# Patient Record
Sex: MALE | Race: WHITE | NOT HISPANIC OR LATINO | URBAN - METROPOLITAN AREA
[De-identification: names, ages, dates, MRNs, and addresses within clinical notes are randomized per-mention and may not be internally consistent; named-entity substitution may affect disease eponyms.]

---

## 2024-03-28 ENCOUNTER — EMERGENCY (EMERGENCY)
Facility: HOSPITAL | Age: 79
LOS: 1 days | Discharge: HOME | End: 2024-03-28
Attending: EMERGENCY MEDICINE | Admitting: EMERGENCY MEDICINE
Payer: MEDICARE

## 2024-03-28 VITALS
HEART RATE: 131 BPM | TEMPERATURE: 98 F | RESPIRATION RATE: 19 BRPM | OXYGEN SATURATION: 96 % | DIASTOLIC BLOOD PRESSURE: 84 MMHG | WEIGHT: 225.09 LBS | SYSTOLIC BLOOD PRESSURE: 114 MMHG

## 2024-03-28 PROCEDURE — 99285 EMERGENCY DEPT VISIT HI MDM: CPT

## 2024-03-28 PROCEDURE — 71045 X-RAY EXAM CHEST 1 VIEW: CPT | Mod: 26

## 2024-03-28 PROCEDURE — 70450 CT HEAD/BRAIN W/O DYE: CPT | Mod: 26,MC

## 2024-03-28 RX ORDER — SODIUM CHLORIDE 9 MG/ML
1000 INJECTION INTRAMUSCULAR; INTRAVENOUS; SUBCUTANEOUS ONCE
Refills: 0 | Status: COMPLETED | OUTPATIENT
Start: 2024-03-28 | End: 2024-03-28

## 2024-03-28 RX ORDER — ACETAMINOPHEN 500 MG
650 TABLET ORAL ONCE
Refills: 0 | Status: COMPLETED | OUTPATIENT
Start: 2024-03-28 | End: 2024-03-28

## 2024-03-28 RX ORDER — SODIUM CHLORIDE 9 MG/ML
500 INJECTION INTRAMUSCULAR; INTRAVENOUS; SUBCUTANEOUS ONCE
Refills: 0 | Status: COMPLETED | OUTPATIENT
Start: 2024-03-28 | End: 2024-03-28

## 2024-03-28 RX ORDER — SODIUM CHLORIDE 9 MG/ML
2000 INJECTION INTRAMUSCULAR; INTRAVENOUS; SUBCUTANEOUS ONCE
Refills: 0 | Status: COMPLETED | OUTPATIENT
Start: 2024-03-28 | End: 2024-03-28

## 2024-03-28 NOTE — ED ADULT TRIAGE NOTE - CHIEF COMPLAINT QUOTE
Pt presents to ed reporting worsening weakness in lower body, right leg worse than left, over x 1 month. denies recent fevers/nausea. also endorses in past month has felt dizziness but only upon ambulation. Denies ha, numbness/tingling

## 2024-03-28 NOTE — ED PROVIDER NOTE - PATIENT PORTAL LINK FT
You can access the FollowMyHealth Patient Portal offered by [facility] by registering at the following website: [email or web address]. By joining [employer]’s FollowMyHealth portal, you will also be able to view your health information using other applications (apps) compatible with our system.

## 2024-03-28 NOTE — ED PROVIDER NOTE - NSFOLLOWUPINSTRUCTIONS_ED_ALL_ED_FT
Community-Acquired Pneumonia, Adult  Pneumonia is a lung infection that causes inflammation and the buildup of mucus and fluids in the lungs. This may cause coughing and difficulty breathing. Community-acquired pneumonia is pneumonia that develops in people who are not, and have not recently been, in a hospital or other health care facility.    Usually, pneumonia develops as a result of an illness that is caused by a virus, such as the common cold and the flu (influenza). It can also be caused by bacteria or fungi. While the common cold and influenza can pass from person to person (are contagious), pneumonia itself is not considered contagious.    What are the causes?  The human body, showing how a virus travels from the air to a person's lungs.   This condition may be caused by:  Viruses.  Bacteria.  Fungi.  What increases the risk?  The following factors may make you more likely to develop this condition:  Being over age 65 or having certain medical conditions, such as:  A long-term (chronic) disease, such as: chronic obstructive pulmonary disease (COPD), asthma, heart failure, diabetes, or kidney disease.  A condition that increases the risk of breathing in (aspirating) mucus and other fluids from your mouth and nose.  A weakened body defense system (immune system).  Having had your spleen removed (splenectomy). The spleen is the organ that helps fight germs and infections.  Not cleaning your teeth and gums well (poor dental hygiene).  Using tobacco products.  Traveling to places where germs that cause pneumonia are present or being near certain animals or animal habitats that could have germs that cause pneumonia.  What are the signs or symptoms?  Symptoms of this condition include:  A dry cough or a wet (productive) cough.  A fever, sweating, or chills.  Chest pain, especially when breathing deeply or coughing.  Fast breathing, difficulty breathing, or shortness of breath.  Tiredness (fatigue) and muscle aches.  How is this diagnosed?  An outline of a person's body and an image of an X-ray of the person's chest.   This condition may be diagnosed based on your medical history or a physical exam. You may also have tests, including:  Imaging, such as a chest X-ray or lung ultrasound.  Tests of:  The level of oxygen and other gases in your blood.  Mucus from your lungs (sputum).  Fluid around your lungs (pleural fluid).  Your urine.  How is this treated?  Treatment for this condition depends on many factors, such as the cause of your pneumonia, your medicines, and other medical conditions that you have.    For most adults, pneumonia may be treated at home. In some cases, treatment must happen in a hospital and may include:  Medicines that are given by mouth (orally) or through an IV, including:  Antibiotic medicines, if bacteria caused the pneumonia.  Medicines that kill viruses (antiviral medicines), if a virus caused the pneumonia.  Oxygen therapy.  Severe pneumonia, although rare, may require the following treatments:  Mechanical ventilation.This procedure uses a machine to help you breathe if you cannot breathe well on your own or maintain a safe level of blood oxygen.  Thoracentesis. This procedure removes any buildup of pleural fluid to help with breathing.  Follow these instructions at home:  A comparison of three sample cups showing dark yellow, yellow, and pale yellow urine.  Medicines    Take over-the-counter and prescription medicines only as told by your health care provider.  Take cough medicine only if you have trouble sleeping. Cough medicine can prevent your body from removing mucus from your lungs.  If you were prescribed antibiotics, take them as told by your health care provider. Do not stop taking the antibiotic even if you start to feel better.  Lifestyle    A sign showing that a person should not drink alcohol.  A sign showing that a person should not smoke.  Do not drink alcohol.  Do not use any products that contain nicotine or tobacco. These products include cigarettes, chewing tobacco, and vaping devices, such as e-cigarettes. If you need help quitting, ask your health care provider.  Eat a healthy diet. This includes plenty of vegetables, fruits, whole grains, low-fat dairy products, and lean protein.  General instructions    Rest a lot and get at least 8 hours of sleep each night.  Sleep in a partly upright position at night. Place a few pillows under your head or sleep in a reclining chair.  Return to your normal activities as told by your health care provider. Ask your health care provider what activities are safe for you.  Drink enough fluid to keep your urine pale yellow. This helps to thin the mucus in your lungs.  If your throat is sore, gargle with a mixture of salt and water 3–4 times a day or as needed. To make salt water, completely dissolve ½–1 tsp (3–6 g) of salt in 1 cup (237 mL) of warm water.  Keep all follow-up visits.  How is this prevented?  You can lower your risk of developing community-acquired pneumonia by:  Getting the pneumonia vaccine. There are different types and schedules of pneumonia vaccines. Ask your health care provider which option is best for you. Consider getting the pneumonia vaccine if:  You are older than 65 years of age.  You are 19–65 years of age and are receiving cancer treatment, have chronic lung disease, or have other medical conditions that affect your immune system. Ask your health care provider if this applies to you.  Getting your influenza vaccine every year. Ask your health care provider which type of vaccine is best for you.  Getting regular dental checkups.  Washing your hands often with soap and water for at least 20 seconds. If soap and water are not available, use hand .  Contact a health care provider if:  You have a fever.  You have trouble sleeping because you cannot control your cough with cough medicine.  Get help right away if:  Your shortness of breath becomes worse.  Your chest pain increases.  Your sickness becomes worse, especially if you are an older adult or have a weak immune system.  You cough up blood.  These symptoms may be an emergency. Get help right away. Call 911.  Do not wait to see if the symptoms will go away.  Do not drive yourself to the hospital.  Summary  Pneumonia is an infection of the lungs.  Community-acquired pneumonia develops in people who have not been in the hospital. It can be caused by bacteria, viruses, or fungi.  This condition may be treated with antibiotics or antiviral medicines.  Severe pneumonia may require a hospital stay and treatment to help with breathing.  This information is not intended to replace advice given to you by your health care provider. Make sure you discuss any questions you have with your health care provider.    Document Revised: 02/15/2023 Document Reviewed: 02/15/2023  Elsevier Patient Education © 2024 [employer]

## 2024-03-28 NOTE — ED PROVIDER NOTE - PHYSICAL EXAMINATION
Constitutional:  Well appearing, awake, alert, oriented  and in no apparent distress.  HEENT: Airway patent, Nasal mucosa clear. Mouth with normal mucosa.   Eyes: Clear bilaterally, pupils equal, round and reactive to light.  Cardiac: Normal rate, regular rhythm.  Respiratory: Breath sounds clear and equal bilaterally.  GI: Abdomen soft, non-tender, no guarding.  MSK: Spine appears normal, range of motion is not limited, no muscle or joint tenderness  Neuro: Alert and oriented, no focal deficits, no motor or sensory deficits, 5/5 strength or bilateral upper and lower extremities,  Skin: Skin normal color for race, warm, dry and intact. No evidence of rash.

## 2024-03-28 NOTE — ED PROVIDER NOTE - OBJECTIVE STATEMENT
78-year-old male with no past medical history presented to the emergency room complaining of weakness, chills.  Patient states he has been feeling the symptoms for the past week, states that he feels weakness in his lower extremities, states that they are bilateral and equal.  Denies any fevers at home.  Denies any cough, runny nose.  States that he feels similar to when he had COVID in the past.  States that he was in [facility] and returned yesterday.  No nausea or vomiting.  Former smoker, denies any significant alcohol or drug usage.

## 2024-03-28 NOTE — ED PROVIDER NOTE - PROGRESS NOTE DETAILS
Patient reports that he is "feeling back to normal".  Vitals improved.  Advised results, patient may have evidence of pneumonia on chest x-ray.  Will treat with azithromycin.  Patient states that he had pneumonia before.  Strongly recommended follow-up with primary care doctor, states he has an appointment for next week.  Return precautions were discussed.  Recommending rest, hydration.  All questions answered, well-appearing upon discharge.

## 2024-03-28 NOTE — ED PROVIDER NOTE - CLINICAL SUMMARY MEDICAL DECISION MAKING FREE TEXT BOX
A/P: 78-year-old male with no past medical history presented to the emergency room complaining of weakness, chills x 1 week  -- Patient found to be rectally febrile  -- Patient is meeting sepsis parameters, differential includes but not limited to influenza, COVID, pneumonia, UTI, viral illness  -- Plan to check labs, cultures, urine studies, chest x-ray, CT head given weakness to evaluate for intracranial pathology

## 2024-03-29 VITALS
OXYGEN SATURATION: 98 % | TEMPERATURE: 99 F | DIASTOLIC BLOOD PRESSURE: 71 MMHG | RESPIRATION RATE: 16 BRPM | HEART RATE: 88 BPM | SYSTOLIC BLOOD PRESSURE: 121 MMHG

## 2024-03-29 LAB
ALBUMIN SERPL ELPH-MCNC: 2.7 G/DL — LOW (ref 3.4–5)
ALP SERPL-CCNC: 64 U/L — SIGNIFICANT CHANGE UP (ref 40–120)
ALT FLD-CCNC: 18 U/L — SIGNIFICANT CHANGE UP (ref 12–42)
ANION GAP SERPL CALC-SCNC: 10 MMOL/L — SIGNIFICANT CHANGE UP (ref 9–16)
APPEARANCE UR: CLEAR — SIGNIFICANT CHANGE UP
AST SERPL-CCNC: 9 U/L — LOW (ref 15–37)
BACTERIA # UR AUTO: ABNORMAL /HPF
BASOPHILS # BLD AUTO: 0.05 K/UL — SIGNIFICANT CHANGE UP (ref 0–0.2)
BASOPHILS NFR BLD AUTO: 0.4 % — SIGNIFICANT CHANGE UP (ref 0–2)
BILIRUB SERPL-MCNC: 1 MG/DL — SIGNIFICANT CHANGE UP (ref 0.2–1.2)
BILIRUB UR-MCNC: NEGATIVE — SIGNIFICANT CHANGE UP
BUN SERPL-MCNC: 14 MG/DL — SIGNIFICANT CHANGE UP (ref 7–23)
CALCIUM SERPL-MCNC: 8.8 MG/DL — SIGNIFICANT CHANGE UP (ref 8.5–10.5)
CHLORIDE SERPL-SCNC: 104 MMOL/L — SIGNIFICANT CHANGE UP (ref 96–108)
CO2 SERPL-SCNC: 23 MMOL/L — SIGNIFICANT CHANGE UP (ref 22–31)
COLOR SPEC: YELLOW — SIGNIFICANT CHANGE UP
COMMENT - URINE: SIGNIFICANT CHANGE UP
CREAT SERPL-MCNC: 0.99 MG/DL — SIGNIFICANT CHANGE UP (ref 0.5–1.3)
DIFF PNL FLD: ABNORMAL
EGFR: 78 ML/MIN/1.73M2 — SIGNIFICANT CHANGE UP
EOSINOPHIL # BLD AUTO: 0.04 K/UL — SIGNIFICANT CHANGE UP (ref 0–0.5)
EOSINOPHIL NFR BLD AUTO: 0.3 % — SIGNIFICANT CHANGE UP (ref 0–6)
EPI CELLS # UR: 3 — SIGNIFICANT CHANGE UP
FLUAV AG NPH QL: SIGNIFICANT CHANGE UP
FLUBV AG NPH QL: SIGNIFICANT CHANGE UP
GLUCOSE SERPL-MCNC: 152 MG/DL — HIGH (ref 70–99)
GLUCOSE UR QL: NEGATIVE MG/DL — SIGNIFICANT CHANGE UP
GRAN CASTS # UR COMP ASSIST: 2 — SIGNIFICANT CHANGE UP
HCT VFR BLD CALC: 39.5 % — SIGNIFICANT CHANGE UP (ref 39–50)
HGB BLD-MCNC: 14 G/DL — SIGNIFICANT CHANGE UP (ref 13–17)
HYALINE CASTS # UR AUTO: 2 — SIGNIFICANT CHANGE UP
IMM GRANULOCYTES NFR BLD AUTO: 0.3 % — SIGNIFICANT CHANGE UP (ref 0–0.9)
KETONES UR-MCNC: 40 MG/DL
LACTATE BLDV-MCNC: 1 MMOL/L — SIGNIFICANT CHANGE UP (ref 0.5–2)
LEUKOCYTE ESTERASE UR-ACNC: NEGATIVE — SIGNIFICANT CHANGE UP
LYMPHOCYTES # BLD AUTO: 0.98 K/UL — LOW (ref 1–3.3)
LYMPHOCYTES # BLD AUTO: 7.5 % — LOW (ref 13–44)
MCHC RBC-ENTMCNC: 32.9 PG — SIGNIFICANT CHANGE UP (ref 27–34)
MCHC RBC-ENTMCNC: 35.4 GM/DL — SIGNIFICANT CHANGE UP (ref 32–36)
MCV RBC AUTO: 92.9 FL — SIGNIFICANT CHANGE UP (ref 80–100)
MONOCYTES # BLD AUTO: 1.39 K/UL — HIGH (ref 0–0.9)
MONOCYTES NFR BLD AUTO: 10.6 % — SIGNIFICANT CHANGE UP (ref 2–14)
NEUTROPHILS # BLD AUTO: 10.6 K/UL — HIGH (ref 1.8–7.4)
NEUTROPHILS NFR BLD AUTO: 80.9 % — HIGH (ref 43–77)
NITRITE UR-MCNC: NEGATIVE — SIGNIFICANT CHANGE UP
NRBC # BLD: 0 /100 WBCS — SIGNIFICANT CHANGE UP (ref 0–0)
PH UR: 5.5 — SIGNIFICANT CHANGE UP (ref 5–8)
PLATELET # BLD AUTO: 259 K/UL — SIGNIFICANT CHANGE UP (ref 150–400)
POTASSIUM SERPL-MCNC: 3.7 MMOL/L — SIGNIFICANT CHANGE UP (ref 3.5–5.3)
POTASSIUM SERPL-SCNC: 3.7 MMOL/L — SIGNIFICANT CHANGE UP (ref 3.5–5.3)
PROT SERPL-MCNC: 7.2 G/DL — SIGNIFICANT CHANGE UP (ref 6.4–8.2)
PROT UR-MCNC: ABNORMAL MG/DL
RBC # BLD: 4.25 M/UL — SIGNIFICANT CHANGE UP (ref 4.2–5.8)
RBC # FLD: 12.5 % — SIGNIFICANT CHANGE UP (ref 10.3–14.5)
RBC CASTS # UR COMP ASSIST: 5 /HPF — HIGH (ref 0–4)
RSV RNA NPH QL NAA+NON-PROBE: SIGNIFICANT CHANGE UP
SARS-COV-2 RNA SPEC QL NAA+PROBE: SIGNIFICANT CHANGE UP
SODIUM SERPL-SCNC: 137 MMOL/L — SIGNIFICANT CHANGE UP (ref 132–145)
SP GR SPEC: 1.02 — SIGNIFICANT CHANGE UP (ref 1–1.03)
TROPONIN I, HIGH SENSITIVITY RESULT: 4.6 NG/L — SIGNIFICANT CHANGE UP
UROBILINOGEN FLD QL: 1 MG/DL — SIGNIFICANT CHANGE UP (ref 0.2–1)
WBC # BLD: 13.1 K/UL — HIGH (ref 3.8–10.5)
WBC # FLD AUTO: 13.1 K/UL — HIGH (ref 3.8–10.5)
WBC UR QL: 4 /HPF — SIGNIFICANT CHANGE UP (ref 0–5)

## 2024-03-29 RX ORDER — AZITHROMYCIN 500 MG/1
250 TABLET, FILM COATED ORAL
Qty: 6 | Refills: 0
Start: 2024-03-29 | End: 2024-04-02

## 2024-03-29 RX ADMIN — Medication 650 MILLIGRAM(S): at 00:07

## 2024-03-29 RX ADMIN — SODIUM CHLORIDE 1000 MILLILITER(S): 9 INJECTION INTRAMUSCULAR; INTRAVENOUS; SUBCUTANEOUS at 00:08

## 2024-03-29 RX ADMIN — SODIUM CHLORIDE 2000 MILLILITER(S): 9 INJECTION INTRAMUSCULAR; INTRAVENOUS; SUBCUTANEOUS at 00:08

## 2024-03-29 RX ADMIN — SODIUM CHLORIDE 1000 MILLILITER(S): 9 INJECTION INTRAMUSCULAR; INTRAVENOUS; SUBCUTANEOUS at 00:29

## 2024-03-29 NOTE — ED ADULT NURSE NOTE - NSFALLRISKINTERV_ED_ALL_ED

## 2024-03-29 NOTE — ED ADULT NURSE NOTE - OBJECTIVE STATEMENT
Patient presents with complaints of worsening generalized weakness more on the legs for a week and chills.  Also endorses in past month has felt dizziness but only upon ambulation. Denies fever, numbness/tingling, nausea, vomiting, SOB, CP. Denies PMH. Febrile in the ED: 102.7. Sepsis protocol initiated.

## 2024-04-01 ENCOUNTER — INPATIENT (INPATIENT)
Facility: HOSPITAL | Age: 79
LOS: 0 days | Discharge: HOME | DRG: 872 | End: 2024-04-02
Attending: STUDENT IN AN ORGANIZED HEALTH CARE EDUCATION/TRAINING PROGRAM | Admitting: STUDENT IN AN ORGANIZED HEALTH CARE EDUCATION/TRAINING PROGRAM
Payer: MEDICARE

## 2024-04-01 VITALS
DIASTOLIC BLOOD PRESSURE: 75 MMHG | OXYGEN SATURATION: 95 % | SYSTOLIC BLOOD PRESSURE: 112 MMHG | HEART RATE: 120 BPM | WEIGHT: 220.02 LBS | TEMPERATURE: 98 F | HEIGHT: 70 IN | RESPIRATION RATE: 16 BRPM

## 2024-04-01 DIAGNOSIS — Z20.822 CONTACT WITH AND (SUSPECTED) EXPOSURE TO COVID-19: ICD-10-CM

## 2024-04-01 DIAGNOSIS — Z87.891 PERSONAL HISTORY OF NICOTINE DEPENDENCE: ICD-10-CM

## 2024-04-01 DIAGNOSIS — R53.1 WEAKNESS: ICD-10-CM

## 2024-04-01 DIAGNOSIS — J18.9 PNEUMONIA, UNSPECIFIED ORGANISM: ICD-10-CM

## 2024-04-01 PROBLEM — Z78.9 OTHER SPECIFIED HEALTH STATUS: Chronic | Status: ACTIVE | Noted: 2024-03-28

## 2024-04-01 LAB
ALBUMIN SERPL ELPH-MCNC: 2.5 G/DL — LOW (ref 3.4–5)
ALP SERPL-CCNC: 72 U/L — SIGNIFICANT CHANGE UP (ref 40–120)
ALT FLD-CCNC: 31 U/L — SIGNIFICANT CHANGE UP (ref 12–42)
ANION GAP SERPL CALC-SCNC: 12 MMOL/L — SIGNIFICANT CHANGE UP (ref 9–16)
APPEARANCE UR: CLEAR — SIGNIFICANT CHANGE UP
APTT BLD: 37.5 SEC — HIGH (ref 24.5–35.6)
AST SERPL-CCNC: 14 U/L — LOW (ref 15–37)
BACTERIA # UR AUTO: ABNORMAL /HPF
BASOPHILS # BLD AUTO: 0.02 K/UL — SIGNIFICANT CHANGE UP (ref 0–0.2)
BASOPHILS NFR BLD AUTO: 0.2 % — SIGNIFICANT CHANGE UP (ref 0–2)
BILIRUB SERPL-MCNC: 0.7 MG/DL — SIGNIFICANT CHANGE UP (ref 0.2–1.2)
BILIRUB UR-MCNC: NEGATIVE — SIGNIFICANT CHANGE UP
BUN SERPL-MCNC: 14 MG/DL — SIGNIFICANT CHANGE UP (ref 7–23)
CALCIUM SERPL-MCNC: 8.9 MG/DL — SIGNIFICANT CHANGE UP (ref 8.5–10.5)
CHLORIDE SERPL-SCNC: 102 MMOL/L — SIGNIFICANT CHANGE UP (ref 96–108)
CO2 SERPL-SCNC: 24 MMOL/L — SIGNIFICANT CHANGE UP (ref 22–31)
COD CRY URNS QL: SIGNIFICANT CHANGE UP
COLOR SPEC: SIGNIFICANT CHANGE UP
COMMENT - URINE: SIGNIFICANT CHANGE UP
CREAT SERPL-MCNC: 1.16 MG/DL — SIGNIFICANT CHANGE UP (ref 0.5–1.3)
DIFF PNL FLD: NEGATIVE — SIGNIFICANT CHANGE UP
EGFR: 65 ML/MIN/1.73M2 — SIGNIFICANT CHANGE UP
EOSINOPHIL # BLD AUTO: 0 K/UL — SIGNIFICANT CHANGE UP (ref 0–0.5)
EOSINOPHIL NFR BLD AUTO: 0 % — SIGNIFICANT CHANGE UP (ref 0–6)
EPI CELLS # UR: PRESENT
FLUAV AG NPH QL: SIGNIFICANT CHANGE UP
FLUBV AG NPH QL: SIGNIFICANT CHANGE UP
GLUCOSE SERPL-MCNC: 175 MG/DL — HIGH (ref 70–99)
GLUCOSE UR QL: NEGATIVE MG/DL — SIGNIFICANT CHANGE UP
GRAN CASTS # UR COMP ASSIST: SIGNIFICANT CHANGE UP
HCT VFR BLD CALC: 40 % — SIGNIFICANT CHANGE UP (ref 39–50)
HGB BLD-MCNC: 14.2 G/DL — SIGNIFICANT CHANGE UP (ref 13–17)
HYALINE CASTS # UR AUTO: SIGNIFICANT CHANGE UP
IMM GRANULOCYTES NFR BLD AUTO: 0.5 % — SIGNIFICANT CHANGE UP (ref 0–0.9)
INR BLD: 1.36 — HIGH (ref 0.85–1.18)
KETONES UR-MCNC: ABNORMAL MG/DL
LACTATE BLDV-MCNC: 1.9 MMOL/L — SIGNIFICANT CHANGE UP (ref 0.5–2)
LEUKOCYTE ESTERASE UR-ACNC: NEGATIVE — SIGNIFICANT CHANGE UP
LYMPHOCYTES # BLD AUTO: 0.67 K/UL — LOW (ref 1–3.3)
LYMPHOCYTES # BLD AUTO: 5.2 % — LOW (ref 13–44)
MCHC RBC-ENTMCNC: 32.6 PG — SIGNIFICANT CHANGE UP (ref 27–34)
MCHC RBC-ENTMCNC: 35.5 GM/DL — SIGNIFICANT CHANGE UP (ref 32–36)
MCV RBC AUTO: 92 FL — SIGNIFICANT CHANGE UP (ref 80–100)
MONOCYTES # BLD AUTO: 1.34 K/UL — HIGH (ref 0–0.9)
MONOCYTES NFR BLD AUTO: 10.3 % — SIGNIFICANT CHANGE UP (ref 2–14)
NEUTROPHILS # BLD AUTO: 10.87 K/UL — HIGH (ref 1.8–7.4)
NEUTROPHILS NFR BLD AUTO: 83.8 % — HIGH (ref 43–77)
NITRITE UR-MCNC: NEGATIVE — SIGNIFICANT CHANGE UP
NRBC # BLD: 0 /100 WBCS — SIGNIFICANT CHANGE UP (ref 0–0)
PH UR: 5.5 — SIGNIFICANT CHANGE UP (ref 5–8)
PLATELET # BLD AUTO: 405 K/UL — HIGH (ref 150–400)
POTASSIUM SERPL-MCNC: 3.6 MMOL/L — SIGNIFICANT CHANGE UP (ref 3.5–5.3)
POTASSIUM SERPL-SCNC: 3.6 MMOL/L — SIGNIFICANT CHANGE UP (ref 3.5–5.3)
PROT SERPL-MCNC: 7.6 G/DL — SIGNIFICANT CHANGE UP (ref 6.4–8.2)
PROT UR-MCNC: 30 MG/DL
PROTHROM AB SERPL-ACNC: 14.8 SEC — HIGH (ref 9.5–13)
RBC # BLD: 4.35 M/UL — SIGNIFICANT CHANGE UP (ref 4.2–5.8)
RBC # FLD: 12.6 % — SIGNIFICANT CHANGE UP (ref 10.3–14.5)
RBC CASTS # UR COMP ASSIST: 1 /HPF — SIGNIFICANT CHANGE UP (ref 0–4)
RSV RNA NPH QL NAA+NON-PROBE: SIGNIFICANT CHANGE UP
SARS-COV-2 RNA SPEC QL NAA+PROBE: SIGNIFICANT CHANGE UP
SODIUM SERPL-SCNC: 138 MMOL/L — SIGNIFICANT CHANGE UP (ref 132–145)
SP GR SPEC: 1.08 — HIGH (ref 1–1.03)
TRI-PHOS CRY UR QL COMP ASSIST: SIGNIFICANT CHANGE UP
TROPONIN I, HIGH SENSITIVITY RESULT: 5.3 NG/L — SIGNIFICANT CHANGE UP
URATE CRY FLD QL MICRO: SIGNIFICANT CHANGE UP
UROBILINOGEN FLD QL: 1 MG/DL — SIGNIFICANT CHANGE UP (ref 0.2–1)
WBC # BLD: 12.96 K/UL — HIGH (ref 3.8–10.5)
WBC # FLD AUTO: 12.96 K/UL — HIGH (ref 3.8–10.5)
WBC UR QL: 3 /HPF — SIGNIFICANT CHANGE UP (ref 0–5)

## 2024-04-01 PROCEDURE — 71045 X-RAY EXAM CHEST 1 VIEW: CPT | Mod: 26

## 2024-04-01 PROCEDURE — 99291 CRITICAL CARE FIRST HOUR: CPT

## 2024-04-01 PROCEDURE — 71275 CT ANGIOGRAPHY CHEST: CPT | Mod: 26,MC

## 2024-04-01 RX ORDER — ACETAMINOPHEN 500 MG
650 TABLET ORAL ONCE
Refills: 0 | Status: COMPLETED | OUTPATIENT
Start: 2024-04-01 | End: 2024-04-01

## 2024-04-01 RX ORDER — ONDANSETRON 8 MG/1
4 TABLET, FILM COATED ORAL ONCE
Refills: 0 | Status: COMPLETED | OUTPATIENT
Start: 2024-04-01 | End: 2024-04-01

## 2024-04-01 RX ORDER — SODIUM CHLORIDE 9 MG/ML
2300 INJECTION INTRAMUSCULAR; INTRAVENOUS; SUBCUTANEOUS ONCE
Refills: 0 | Status: COMPLETED | OUTPATIENT
Start: 2024-04-01 | End: 2024-04-01

## 2024-04-01 RX ORDER — CEFTRIAXONE 500 MG/1
1000 INJECTION, POWDER, FOR SOLUTION INTRAMUSCULAR; INTRAVENOUS ONCE
Refills: 0 | Status: COMPLETED | OUTPATIENT
Start: 2024-04-01 | End: 2024-04-01

## 2024-04-01 RX ADMIN — SODIUM CHLORIDE 2300 MILLILITER(S): 9 INJECTION INTRAMUSCULAR; INTRAVENOUS; SUBCUTANEOUS at 17:00

## 2024-04-01 RX ADMIN — Medication 650 MILLIGRAM(S): at 17:14

## 2024-04-01 RX ADMIN — CEFTRIAXONE 100 MILLIGRAM(S): 500 INJECTION, POWDER, FOR SOLUTION INTRAMUSCULAR; INTRAVENOUS at 17:14

## 2024-04-01 RX ADMIN — ONDANSETRON 4 MILLIGRAM(S): 8 TABLET, FILM COATED ORAL at 16:59

## 2024-04-01 NOTE — ED PROVIDER NOTE - PROGRESS NOTE DETAILS
Patient agreeable to admission but requested [facility]. I consulted with hospitalist attending at [facility] Dr. Siu who rejected transfer. Patient agreed to admission at [facility]. [facility] hospitalist will call back. Signed out to Dr. Abreu. Plan is to admit. No significant change on CXR. Will get CTA chest. Dr. Abreu: received sign out from Dr. Fitzgerald. Dr. Abreu: talked with cardiology, Dr. Rajan, who states cardiology will follow for official echo but patient can be admitted to medicine.

## 2024-04-01 NOTE — ED ADULT TRIAGE NOTE - CHIEF COMPLAINT QUOTE
Pt was seen in ED last week and diagnosed with pneumonia. Pt has been taking abx as prescribed but has had nausea since Friday and decreased PO fluids. No emesis. Pt eating normally. Has had diarrhea everyday since starting abx.

## 2024-04-01 NOTE — ED PROVIDER NOTE - OBJECTIVE STATEMENT
Patient seen here on 3/28 for chest pain, diagnosed with pneumonia. Prescribed z-alexi. Has taken 4 doses without improvement. Also has nausea and diarrhea since starting antibiotics, poor PO intake. Found to be febrile rectally here. No rhinorrhea, cough, ap, vomiting, dysuria, shortness of breath.

## 2024-04-01 NOTE — ED PROVIDER NOTE - CARE PLAN
Principal Discharge DX:	Sepsis  Secondary Diagnosis:	LLL pneumonia  Secondary Diagnosis:	Pericardial effusion   1

## 2024-04-01 NOTE — ED PROVIDER NOTE - PHYSICAL EXAMINATION
Gen: Well-developed, well-nourished, NAD, VS as noted by nursing. HEENT: NCAT, mmm   Chest: RRR, nl S1 and S2, no m/r/g. Resp: No respiratory distress. decreased breath sounds RLL and LLL, no w/r/r  Abd: nl BS, soft, nt/nd. Ext: Warm, dry  Neuro: CN II-XII intact, normal and equal strength, sensation, and reflexes bilaterally, speech clear  Psych: AAOx3

## 2024-04-01 NOTE — ED PROVIDER NOTE - CLINICAL SUMMARY MEDICAL DECISION MAKING FREE TEXT BOX
Code sepsis called for fever and tachycardia. will get labs, give IV fluids and IV antibiotics, reassess.

## 2024-04-01 NOTE — ED ADULT NURSE NOTE - OBJECTIVE STATEMENT
as above dx pneumonia 5 days ago at [facility], states wasn't given ivabs sent home on po abs which is due to finish tomorrow, pt feeling nauseous "for days anjd lethargic" unable to leave the house,not eating fevers , speaking in full sentences gcs 15

## 2024-04-02 ENCOUNTER — TRANSCRIPTION ENCOUNTER (OUTPATIENT)
Age: 79
End: 2024-04-02

## 2024-04-02 ENCOUNTER — RESULT REVIEW (OUTPATIENT)
Age: 79
End: 2024-04-02

## 2024-04-02 VITALS — WEIGHT: 220.02 LBS

## 2024-04-02 DIAGNOSIS — I31.39 OTHER PERICARDIAL EFFUSION (NONINFLAMMATORY): ICD-10-CM

## 2024-04-02 DIAGNOSIS — A41.9 SEPSIS, UNSPECIFIED ORGANISM: ICD-10-CM

## 2024-04-02 DIAGNOSIS — I31.9 DISEASE OF PERICARDIUM, UNSPECIFIED: ICD-10-CM

## 2024-04-02 DIAGNOSIS — D75.839 THROMBOCYTOSIS, UNSPECIFIED: ICD-10-CM

## 2024-04-02 DIAGNOSIS — J90 PLEURAL EFFUSION, NOT ELSEWHERE CLASSIFIED: ICD-10-CM

## 2024-04-02 DIAGNOSIS — Z29.9 ENCOUNTER FOR PROPHYLACTIC MEASURES, UNSPECIFIED: ICD-10-CM

## 2024-04-02 LAB
A1C WITH ESTIMATED AVERAGE GLUCOSE RESULT: 5.5 % — SIGNIFICANT CHANGE UP (ref 4–5.6)
ADD ON TEST-SPECIMEN IN LAB: SIGNIFICANT CHANGE UP
ANION GAP SERPL CALC-SCNC: 5 MMOL/L — SIGNIFICANT CHANGE UP (ref 5–17)
BASOPHILS # BLD AUTO: 0.03 K/UL — SIGNIFICANT CHANGE UP (ref 0–0.2)
BASOPHILS NFR BLD AUTO: 0.3 % — SIGNIFICANT CHANGE UP (ref 0–2)
BUN SERPL-MCNC: 14 MG/DL — SIGNIFICANT CHANGE UP (ref 7–23)
CALCIUM SERPL-MCNC: 8.8 MG/DL — SIGNIFICANT CHANGE UP (ref 8.4–10.5)
CHLORIDE SERPL-SCNC: 104 MMOL/L — SIGNIFICANT CHANGE UP (ref 96–108)
CHOLEST SERPL-MCNC: 125 MG/DL — SIGNIFICANT CHANGE UP
CO2 SERPL-SCNC: 29 MMOL/L — SIGNIFICANT CHANGE UP (ref 22–31)
CREAT SERPL-MCNC: 0.81 MG/DL — SIGNIFICANT CHANGE UP (ref 0.5–1.3)
CRP SERPL-MCNC: 253.8 MG/L — HIGH (ref 0–4)
CULTURE RESULTS: SIGNIFICANT CHANGE UP
EGFR: 91 ML/MIN/1.73M2 — SIGNIFICANT CHANGE UP
EOSINOPHIL # BLD AUTO: 0.07 K/UL — SIGNIFICANT CHANGE UP (ref 0–0.5)
EOSINOPHIL NFR BLD AUTO: 0.7 % — SIGNIFICANT CHANGE UP (ref 0–6)
ERYTHROCYTE [SEDIMENTATION RATE] IN BLOOD: 101 MM/HR — HIGH
ESTIMATED AVERAGE GLUCOSE: 111 MG/DL — SIGNIFICANT CHANGE UP (ref 68–114)
GLUCOSE SERPL-MCNC: 190 MG/DL — HIGH (ref 70–99)
HCT VFR BLD CALC: 34.5 % — LOW (ref 39–50)
HCV AB S/CO SERPL IA: 0.04 S/CO — SIGNIFICANT CHANGE UP
HCV AB SERPL-IMP: SIGNIFICANT CHANGE UP
HDLC SERPL-MCNC: 31 MG/DL — LOW
HGB BLD-MCNC: 11.9 G/DL — LOW (ref 13–17)
IMM GRANULOCYTES NFR BLD AUTO: 0.4 % — SIGNIFICANT CHANGE UP (ref 0–0.9)
LIPID PNL WITH DIRECT LDL SERPL: 76 MG/DL — SIGNIFICANT CHANGE UP
LYMPHOCYTES # BLD AUTO: 1.36 K/UL — SIGNIFICANT CHANGE UP (ref 1–3.3)
LYMPHOCYTES # BLD AUTO: 13.5 % — SIGNIFICANT CHANGE UP (ref 13–44)
MAGNESIUM SERPL-MCNC: 3.3 MG/DL — HIGH (ref 1.6–2.6)
MCHC RBC-ENTMCNC: 32.1 PG — SIGNIFICANT CHANGE UP (ref 27–34)
MCHC RBC-ENTMCNC: 34.5 GM/DL — SIGNIFICANT CHANGE UP (ref 32–36)
MCV RBC AUTO: 93 FL — SIGNIFICANT CHANGE UP (ref 80–100)
MONOCYTES # BLD AUTO: 0.94 K/UL — HIGH (ref 0–0.9)
MONOCYTES NFR BLD AUTO: 9.3 % — SIGNIFICANT CHANGE UP (ref 2–14)
NEUTROPHILS # BLD AUTO: 7.67 K/UL — HIGH (ref 1.8–7.4)
NEUTROPHILS NFR BLD AUTO: 75.8 % — SIGNIFICANT CHANGE UP (ref 43–77)
NON HDL CHOLESTEROL: 94 MG/DL — SIGNIFICANT CHANGE UP
NRBC # BLD: 0 /100 WBCS — SIGNIFICANT CHANGE UP (ref 0–0)
NT-PROBNP SERPL-SCNC: 583 PG/ML — HIGH (ref 0–300)
PHOSPHATE SERPL-MCNC: 1.8 MG/DL — LOW (ref 2.5–4.5)
PLATELET # BLD AUTO: 326 K/UL — SIGNIFICANT CHANGE UP (ref 150–400)
POTASSIUM SERPL-MCNC: 3.6 MMOL/L — SIGNIFICANT CHANGE UP (ref 3.5–5.3)
POTASSIUM SERPL-SCNC: 3.6 MMOL/L — SIGNIFICANT CHANGE UP (ref 3.5–5.3)
RAPID RVP RESULT: SIGNIFICANT CHANGE UP
RBC # BLD: 3.71 M/UL — LOW (ref 4.2–5.8)
RBC # FLD: 13.2 % — SIGNIFICANT CHANGE UP (ref 10.3–14.5)
SARS-COV-2 RNA SPEC QL NAA+PROBE: SIGNIFICANT CHANGE UP
SODIUM SERPL-SCNC: 138 MMOL/L — SIGNIFICANT CHANGE UP (ref 135–145)
SPECIMEN SOURCE: SIGNIFICANT CHANGE UP
TRIGL SERPL-MCNC: 91 MG/DL — SIGNIFICANT CHANGE UP
WBC # BLD: 10.11 K/UL — SIGNIFICANT CHANGE UP (ref 3.8–10.5)
WBC # FLD AUTO: 10.11 K/UL — SIGNIFICANT CHANGE UP (ref 3.8–10.5)

## 2024-04-02 PROCEDURE — 83880 ASSAY OF NATRIURETIC PEPTIDE: CPT

## 2024-04-02 PROCEDURE — 99221 1ST HOSP IP/OBS SF/LOW 40: CPT

## 2024-04-02 PROCEDURE — 85610 PROTHROMBIN TIME: CPT

## 2024-04-02 PROCEDURE — 84484 ASSAY OF TROPONIN QUANT: CPT

## 2024-04-02 PROCEDURE — 85025 COMPLETE CBC W/AUTO DIFF WBC: CPT

## 2024-04-02 PROCEDURE — 83735 ASSAY OF MAGNESIUM: CPT

## 2024-04-02 PROCEDURE — 71045 X-RAY EXAM CHEST 1 VIEW: CPT

## 2024-04-02 PROCEDURE — 96375 TX/PRO/DX INJ NEW DRUG ADDON: CPT

## 2024-04-02 PROCEDURE — 87086 URINE CULTURE/COLONY COUNT: CPT

## 2024-04-02 PROCEDURE — 99291 CRITICAL CARE FIRST HOUR: CPT

## 2024-04-02 PROCEDURE — 93306 TTE W/DOPPLER COMPLETE: CPT

## 2024-04-02 PROCEDURE — 81001 URINALYSIS AUTO W/SCOPE: CPT

## 2024-04-02 PROCEDURE — 86803 HEPATITIS C AB TEST: CPT

## 2024-04-02 PROCEDURE — 80061 LIPID PANEL: CPT

## 2024-04-02 PROCEDURE — 0225U NFCT DS DNA&RNA 21 SARSCOV2: CPT

## 2024-04-02 PROCEDURE — 83036 HEMOGLOBIN GLYCOSYLATED A1C: CPT

## 2024-04-02 PROCEDURE — 80053 COMPREHEN METABOLIC PANEL: CPT

## 2024-04-02 PROCEDURE — 86140 C-REACTIVE PROTEIN: CPT

## 2024-04-02 PROCEDURE — 84100 ASSAY OF PHOSPHORUS: CPT

## 2024-04-02 PROCEDURE — 93306 TTE W/DOPPLER COMPLETE: CPT | Mod: 26

## 2024-04-02 PROCEDURE — 80048 BASIC METABOLIC PNL TOTAL CA: CPT

## 2024-04-02 PROCEDURE — 99223 1ST HOSP IP/OBS HIGH 75: CPT

## 2024-04-02 PROCEDURE — 99239 HOSP IP/OBS DSCHRG MGMT >30: CPT

## 2024-04-02 PROCEDURE — 85730 THROMBOPLASTIN TIME PARTIAL: CPT

## 2024-04-02 PROCEDURE — 36415 COLL VENOUS BLD VENIPUNCTURE: CPT

## 2024-04-02 PROCEDURE — 96374 THER/PROPH/DIAG INJ IV PUSH: CPT

## 2024-04-02 PROCEDURE — 87637 SARSCOV2&INF A&B&RSV AMP PRB: CPT

## 2024-04-02 PROCEDURE — 71275 CT ANGIOGRAPHY CHEST: CPT | Mod: MC

## 2024-04-02 PROCEDURE — 83605 ASSAY OF LACTIC ACID: CPT

## 2024-04-02 PROCEDURE — 85652 RBC SED RATE AUTOMATED: CPT

## 2024-04-02 PROCEDURE — 87040 BLOOD CULTURE FOR BACTERIA: CPT

## 2024-04-02 RX ORDER — PANTOPRAZOLE SODIUM 20 MG/1
40 TABLET, DELAYED RELEASE ORAL
Refills: 0 | Status: DISCONTINUED | OUTPATIENT
Start: 2024-04-02 | End: 2024-04-02

## 2024-04-02 RX ORDER — COLCHICINE 0.6 MG
0.6 TABLET ORAL EVERY 12 HOURS
Refills: 0 | Status: DISCONTINUED | OUTPATIENT
Start: 2024-04-02 | End: 2024-04-02

## 2024-04-02 RX ORDER — PANTOPRAZOLE SODIUM 20 MG/1
1 TABLET, DELAYED RELEASE ORAL
Qty: 30 | Refills: 0
Start: 2024-04-02 | End: 2024-05-01

## 2024-04-02 RX ORDER — ASPIRIN/CALCIUM CARB/MAGNESIUM 324 MG
2 TABLET ORAL
Qty: 84 | Refills: 0
Start: 2024-04-02 | End: 2024-04-15

## 2024-04-02 RX ORDER — IBUPROFEN 200 MG
1 TABLET ORAL
Qty: 42 | Refills: 0
Start: 2024-04-02 | End: 2024-04-15

## 2024-04-02 RX ORDER — COLCHICINE 0.6 MG
1 TABLET ORAL
Qty: 60 | Refills: 2
Start: 2024-04-02

## 2024-04-02 RX ORDER — ASPIRIN/CALCIUM CARB/MAGNESIUM 324 MG
650 TABLET ORAL EVERY 8 HOURS
Refills: 0 | Status: DISCONTINUED | OUTPATIENT
Start: 2024-04-02 | End: 2024-04-02

## 2024-04-02 RX ADMIN — Medication 650 MILLIGRAM(S): at 13:46

## 2024-04-02 RX ADMIN — Medication 650 MILLIGRAM(S): at 05:50

## 2024-04-02 RX ADMIN — Medication 0.6 MILLIGRAM(S): at 09:26

## 2024-04-02 RX ADMIN — Medication 85 MILLIMOLE(S): at 12:12

## 2024-04-02 NOTE — DISCHARGE NOTE PROVIDER - NSDCCPTREATMENT_GEN_ALL_CORE_FT
PRINCIPAL PROCEDURE  Procedure: CT chest w con  Findings and Treatment: Moderate-sized circumferential pericardial effusion. Small bilateral   pleural effusions with adjacent left lower lung compressive areas of   atelectasis. Constellation of findings can be seen in the setting of   constrictive pericarditis.       PRINCIPAL PROCEDURE  Procedure: CT chest w con  Findings and Treatment: Moderate-sized circumferential pericardial effusion. Small bilateral   pleural effusions with adjacent left lower lung compressive areas of   atelectasis. Constellation of findings can be seen in the setting of   constrictive pericarditis.        SECONDARY PROCEDURE  Procedure: Transthoracic echo  Findings and Treatment: 1. Patient was tachycardic during the study.   2. Normal left ventricular size and systolic function.   3. Normal right ventricular size and systolic function.   4. No evidence of pulmonary hypertension.   5. Moderate pericardial effusion without echocardiographic evidence of   cardiac tamponade physiology. There is thickening of the pericardium. On tissue doppler imaging, medial annular velocities are higher than lateral annular velocities, suggestive of annulus reversus. If symptoms do not improve, consider repeat TTE in 2 weeks to evaluate for possible   constrictive effusive pericarditis.

## 2024-04-02 NOTE — H&P ADULT - ATTENDING COMMENTS
77 yo M with no significant PMHx px from home with several day hx of nausea and poor PO intake found to have CT evidence suggestive of constrictive pericarditis admitted for further evaluation and management.       #Constrictive pericarditis – VSS. +Pleuritic chest pain. No JVD on exam. CTA Chest currently showing moderate-sized circumferential pericardial effusion, small bilateral pleural effusions with adjacent left lower lung compressive areas of atelectasis, with findings suggestive of constrictive pericarditis. Troponin I negative. EKG sinus tachycardia. Cardiology consulted from [facility] (Dr. Rajan), no urgent interventions indicated at this time. No concerns for tamponade at this time. F/U ESR/CRP. F/U TTE. F/U full RVP. Initiate high-dose ASA/Cochicine pending further workup. Caution with further IVF.     #Sepsis - Tmax 101.3F. -120s. WBC 12.96. Neutrophil-predominant. Lactate 1.9. COVID/Flu/RSV PCR negative. Bcx from 3/28-3/29 NGTD. CXR with LLL consolidation vs effusion however CTA reading LLL atelectasis. DC’d from ED 3/29 with Azithromycin however pt denies all hx of URI-like sx (no cough, shortness of breath, sore throat, etc.). No other localizing complaints. Monitor off abx. Tx pericarditis as above.     Agree with remainder of resident plan as above.

## 2024-04-02 NOTE — DIETITIAN INITIAL EVALUATION ADULT - PERTINENT LABORATORY DATA
04-02    138  |  104  |  14  ----------------------------<  190<H>  3.6   |  29  |  0.81    Ca    8.8      02 Apr 2024 05:30  Phos  1.8     04-02  Mg     3.3     04-02    TPro  7.6  /  Alb  2.5<L>  /  TBili  0.7  /  DBili  x   /  AST  14<L>  /  ALT  31  /  AlkPhos  72  04-01

## 2024-04-02 NOTE — H&P ADULT - NSHPLABSRESULTS_GEN_ALL_CORE
14.2   12.96 )-----------( 405      ( 2024 16:30 )             40.0       04-    138  |  102  |  14  ----------------------------<  175<H>  3.6   |  24  |  1.16    Ca    8.9      2024 16:30    TPro  7.6  /  Alb  2.5<L>  /  TBili  0.7  /  DBili  x   /  AST  14<L>  /  ALT  31  /  AlkPhos  72  04-              Urinalysis Basic - ( 2024 16:30 )    Color: Dark Yellow / Appearance: Clear / S.085 / pH: x  Gluc: 175 mg/dL / Ketone: Trace mg/dL  / Bili: Negative / Urobili: 1.0 mg/dL   Blood: x / Protein: 30 mg/dL / Nitrite: Negative   Leuk Esterase: Negative / RBC: 1 /HPF / WBC 3 /HPF   Sq Epi: x / Non Sq Epi: x / Bacteria: Few /HPF        PT/INR - ( 2024 16:30 )   PT: 14.8 sec;   INR: 1.36          PTT - ( 2024 16:30 )  PTT:37.5 sec    Lactate Trend            CAPILLARY BLOOD GLUCOSE              Culture Results:   No growth at 72 Hours ( @ 00:30)  Culture Results:   No growth at 72 Hours ( @ 23:25)

## 2024-04-02 NOTE — DIETITIAN INITIAL EVALUATION ADULT - PROBLEM SELECTOR PLAN 2
Patient noted to have moderate pericardial effusion with small bilateral pleural effusions, findings suspicious for pericarditis. Patient does report some pleuritic chest pain and recent chills. Otherwise feels well. VS significant initially for tachycardia and fever. Mild leukocytosis to 12.9. Cardiology consulted by ED. Trop negative. EKG sinus tach. No clinical signs of tamponade. No risk factors for autoimmune dz.  - add on RVP  - f/u TTE   - f/u inflammatory markers  - start ASA 650mg q8h + colchicine 0.6mg bid

## 2024-04-02 NOTE — H&P ADULT - PROBLEM SELECTOR PLAN 3
Patient with b/l pleural effusions on CXR, CT showing small bilateral pleural effusions with adjacent left lower lung compressive areas of atelectasis. Currently on room air, denies shortness of breath, appears comfortable.   - incentive spirometer  - monitor O2 requirements

## 2024-04-02 NOTE — H&P ADULT - HISTORY OF PRESENT ILLNESS
HPI: Patient is a 79yo M with no signficant PMH who presents with nausea, diarrhea, and decreased PO intake x 3 days. Was last seen in the ED 4 days ago wtih c/o chest pain, found to have a PNA, discharged on Z-alexi from the ED. Reports he does not feel any improvement in symptoms. Now additionally with nausea and diarrhea since starting antibiotics. ROS also positive for poor PO intake. Denies cough, abdominal pain, vomiting, dysuria, shortness of breath. Does have rhinorrhea and watery eyes, but likely iso seasonal allergies. Does report some chest pain, particularly when he was in the CT scanner and asked to hold his breath, pleuritic in nature now improved. Denies taking any medications outside of recent antibiotics and occasional Tums use. No personal or family hx of autoimmune disorders. Overall feels well.     In the ED:  Vital Signs: T 98 F --> 101.3 F, , /75, RR 16, SpO2 95% on room air  Labs: significant for WBC 12.96, plt 405, UA: spec grav 1.085  CTA PE: Limited PE study for evaluation of some of the subsegmental and a few of the segmental pulmonary arterial branches due to respiratory motion artifact. No pulmonary arterial emboli within the other well visualized pulmonary arteries. Moderate-sized circumferential pericardial effusion. Small bilateral pleural effusions with adjacent left lower lung compressive areas of atelectasis. Constellation of findings can be seen in the setting of constrictive pericarditis.  EKG: sinus tachycardia  Interventions: NS 2.3L bolus x 1, zofran 4mg IV x 1, tylenol 650mg PO x 1, CTX 1g x 1  Consults: cardiology HPI: Patient is a 79yo M with no significant PMH who presents with nausea, diarrhea, and decreased PO intake x 3 days. Was last seen in the ED 4 days ago wtih c/o chest pain, found to have a PNA, discharged on Z-alexi from the ED. Reports he does not feel any improvement in symptoms. Now additionally with nausea and diarrhea since starting antibiotics. ROS also positive for poor PO intake. Denies cough, abdominal pain, vomiting, dysuria, shortness of breath. Does have rhinorrhea and watery eyes, but likely iso seasonal allergies. Does report some chest pain, particularly when he was in the CT scanner and asked to hold his breath, pleuritic in nature now improved. Denies taking any medications outside of recent antibiotics and occasional Tums use. No personal or family hx of autoimmune disorders. No obvious sick contacts, however is around grandchildren who may have been sick recently. Overall feels well.     In the ED:  Vital Signs: T 98 F --> 101.3 F, , /75, RR 16, SpO2 95% on room air  Labs: significant for WBC 12.96, plt 405, UA: spec grav 1.085  CTA PE: Limited PE study for evaluation of some of the subsegmental and a few of the segmental pulmonary arterial branches due to respiratory motion artifact. No pulmonary arterial emboli within the other well visualized pulmonary arteries. Moderate-sized circumferential pericardial effusion. Small bilateral pleural effusions with adjacent left lower lung compressive areas of atelectasis. Constellation of findings can be seen in the setting of constrictive pericarditis.  EKG: sinus tachycardia  Interventions: NS 2.3L bolus x 1, zofran 4mg IV x 1, tylenol 650mg PO x 1, CTX 1g x 1  Consults: cardiology

## 2024-04-02 NOTE — CONSULT NOTE ADULT - ASSESSMENT
Patient is a 78M with no significant PMH who presents with chills, nausea, diarrhea, runny nose, fatigue for the past moth. CT PE showed moderate pericardial effusion. Cardiology was consulted for pericarditis.     Review of systems:   EKG: sinus tachycardia, RAD  ECHO (4/2/24):  Patient was tachycardic during the study. Normal LV and RV function and size. No evidence of pulmonary hypertension. Moderate pericardial effusion without echocardiographic evidence of cardiac tamponade physiology. There is thickening of the pericardium. On   tissue doppler imaging, medial annular velocities are higher than lateral annular velocities, suggestive of annulus reversus. If symptoms do not improve, consider repeat TTE in 2 weeks to evaluate for possible constrictive effusive pericarditis. Correlate clinically.No prior echo is available for comparison.  CTA: Limited PE study for evaluation of some of the subsegmental and a few of the segmental pulmonary arterial branches due to respiratory motion artifact. No pulmonary arterial emboli within the other well visualized pulmonary arteries.  Moderate-sized circumferential pericardial effusion. Small bilateral pleural effusions with adjacent left lower lung compressive areas of atelectasis. Constellation of findings can be seen in the setting of constrictive pericarditis.      # Acute constrictive effusive pericarditis  Likely in a setting of a recent viral illness. After initiation of asa and colchicine patient reports improvement.  Patient is warm, well perfuses, not fluid overloaded on physical exam  - please complete work up for pericardial effusion: HIV test, TSH, EDWARDO  - c/w asa 560 mg PO q8h and colchicine 0.6 mg PO BID   - recommend   - patient should follow up with Dr. Finkelstein in 2 week to look for improvement     #Coronary calcifications/aortic calcifications  Noted extensive coronary calcifications on CTA  Patient is asymptomatic , will follow up with a cardiologist as an outpatient   - please add on HgA1c and Lipid panel   - please start Atorvastatin 40 mg PO daily       Patient should follow up with Dr. Finkelstein in 2 week to look for improvement  Patient is a 78M with no significant PMH who presents with chills, nausea, diarrhea, runny nose, fatigue for the past moth. CT PE showed moderate pericardial effusion. Cardiology was consulted for pericarditis.     Review of systems:   EKG: sinus tachycardia, RAD  ECHO (4/2/24):  Patient was tachycardic during the study. Normal LV and RV function and size. No evidence of pulmonary hypertension. Moderate pericardial effusion without echocardiographic evidence of cardiac tamponade physiology. There is thickening of the pericardium. On   tissue doppler imaging, medial annular velocities are higher than lateral annular velocities, suggestive of annulus reversus. If symptoms do not improve, consider repeat TTE in 2 weeks to evaluate for possible constrictive effusive pericarditis. Correlate clinically.No prior echo is available for comparison.  CTA: Limited PE study for evaluation of some of the subsegmental and a few of the segmental pulmonary arterial branches due to respiratory motion artifact. No pulmonary arterial emboli within the other well visualized pulmonary arteries.  Moderate-sized circumferential pericardial effusion. Small bilateral pleural effusions with adjacent left lower lung compressive areas of atelectasis. Constellation of findings can be seen in the setting of constrictive pericarditis.      # Acute constrictive effusive pericarditis  Likely in a setting of a recent viral illness. After initiation of asa and colchicine patient reports improvement.  Patient is warm, well perfuses, not fluid overloaded on physical exam  - please complete work up for pericardial effusion: HIV test, TSH, EDWARDO  - stop asa 560 mg PO q8h, start Ibuprofen 800 mg PO q8h PO   - c/w colchicine 0.6 mg PO BID   - recommend   - patient should follow up with Dr. Finkelstein in 2 week to look for improvement     #Coronary calcifications/aortic calcifications  Noted extensive coronary calcifications on CTA  Patient is asymptomatic , will follow up with a cardiologist as an outpatient   - please add on HgA1c and Lipid panel   - please start Atorvastatin 40 mg PO daily       Patient should follow up with Dr. Finkelstein in 2 week to look for improvement

## 2024-04-02 NOTE — CONSULT NOTE ADULT - ATTENDING COMMENTS
Patient is a 79 yo Former smoker M with no significant PMH BIBEMS from [facility] where he presented  with chills, nausea, diarrhea, found to have moderate pericardial effusion with concern for Acute pericarditis for which Cardiology was consulted.    Review of systems:   - TTE (4/2/24):   Normal LV and RV function and size.  Moderate pericardial effusion without echocardiographic evidence of cardiac tamponade physiology. There is thickening of the pericardium. On tissue doppler imaging, medial annular velocities are higher than lateral annular velocities, suggestive of annulus reversus. If symptoms do not improve, consider repeat TTE in 2 weeks to evaluate for possible constrictive effusive pericarditis. Correlate clinically. No prior echo is available for comparison.  - CTA 04/01/2024 : Limited PE study for evaluation of some of the subsegmental and a few of the segmental pulmonary arterial branches due to respiratory motion artifact. No pulmonary arterial emboli within the other well visualized pulmonary arteries. Moderate-sized circumferential pericardial effusion. Small bilateral pleural effusions with adjacent left lower lung compressive areas of atelectasis. Constellation of findings can be seen in the setting of constrictive pericarditis.  - LABS: ESR:101; CRP: 253.8; bnp: 583    # Acute Effusive Pericarditis with concern for Constrictive process  - Patient presented to the ER on (03/28)  with 1 month of worsening weakness, fatigue and dizziness. He was diagnosed with PNA and discharged home on Azithromycin.  - He returned to [facility] on 04/1 with persistent Nausea, decrease PO intake, and loose stools, noted to be Febrile to 101.6 rectally. He underwent CTPE which revealed moderate pericardial effusion with concern for constrictive pericarditis and patient was admitted to the [facility]  - Today patient and wife at bedside report that in the last month they had vacationed to their [facility] in [place]. During that time wife reported patient lacked energy, was fatigued, often complained of being cold and having chills. He reported rhinorrhea with frequent sternutation, sore throat and a dry cough along with myalgias  - CTPE reviewed showing a thickened pericardium and moderate pericardial effusion. This is consistent with Echo performed today which revealed normal BIV function with Moderate pericardial effusion without signs of tamponade; Echo notable for annulus reversus suspicious for possible constrictive effusive pericarditis  - Inflammatory markers significantly elevated: ESR:101; CRP: 253.8; Viral Panel and Covid swab negative, however patient already >1 month out of original viral symptoms   - Clinically patient is warm, well perfused and well compensated. JVP is to the clavicle, and he has trace pedal edema  - At this time would like to treat patient for Acute pericarditis with NSAIDS in lieu of ASA. Would favor Ibuprofen 600-800 mg po q/8 for 2 weeks and continue with Colchicine 0.6 mg po BID for 3 months.   - Please ensure Patient maintained on PPI during duration of treatment, especially given history of GERD  - Patient will need outpatient repeat Echo as well as MRI to evaluate for LGE and constrictive physiology once out of the acute reactive phase    # CAD: Coronary calcifications/aortic calcifications noted on CT  - Noted extensive coronary calcifications on CTPE protocol  - Patient has good performance status without any exertional symptoms  - Please add on A1C, TSH and lipid profile to am labs an initiate Lipitor 40 mg po daily  - Patient will need outpatient evaluation of CAD. This was discussed with him , wife and son at bedside      Please ensure patient has outpatient referral for Dr. Doug Diggs at [facility] location within 2 weeks of discharge. He has been made aware of patient's Hospitalization to help coordinate outpatient follow up.   Please call cardiology with any questions

## 2024-04-02 NOTE — H&P ADULT - PROBLEM SELECTOR PLAN 2
Patient noted to have moderate pericardial effusion with small bilateral pleural effusions, findings suspicious for pericarditis. Patient does report some pleuritic chest pain and recent chills. Otherwise feels well. VS significant initially for tachycardia and fever. Mild leukocytosis to 12.9. Cardiology consulted by ED. Trop negative. EKG sinus tach. No clinical signs of tamponade.   - add on RVP  - f/u TTE   - f/u inflammatory markers  - ibuprofen 600mg q8h standing + colchicine 0.6mg bid Patient noted to have moderate pericardial effusion with small bilateral pleural effusions, findings suspicious for pericarditis. Patient does report some pleuritic chest pain and recent chills. Otherwise feels well. VS significant initially for tachycardia and fever. Mild leukocytosis to 12.9. Cardiology consulted by ED. Trop negative. EKG sinus tach. No clinical signs of tamponade. No risk factors for autoimmune dz.  - add on RVP  - f/u TTE   - f/u inflammatory markers  - start ASA 650mg q8h + colchicine 0.6mg bid

## 2024-04-02 NOTE — DISCHARGE NOTE PROVIDER - NSDCCPCAREPLAN_GEN_ALL_CORE_FT
PRINCIPAL DISCHARGE DIAGNOSIS  Diagnosis: Pericarditis  Assessment and Plan of Treatment: You were diagnosed with a condition called pericarditis, which is inflammation of your heart. This diagnosis was made based on your symptoms, labs, and imaging findings. This likely occurred due to a viral infection you had at home. Pericarditis can have MANY causes. It often presents with chest pain that worsens with deep breaths. An ultrasound of your heart was performed which showed ___ . You were treated with aspirin and colchicine. Please continue to take these medications at home. Take aspirin 650mg every 8 hours for 2 weeks. Take colchine 0.6mg every 12 hours for 2 months. Please follow up with your PCP within 2 weeks of discharge.      SECONDARY DISCHARGE DIAGNOSES  Diagnosis: Sepsis  Assessment and Plan of Treatment: You were found to have sepsis, which is an inflammatory reaction that can occur due to an infection. This was diagnosed based on your labs and vitals. Your symptoms are now improving. Please follow up with your PCP after discharge.     PRINCIPAL DISCHARGE DIAGNOSIS  Diagnosis: Pericarditis  Assessment and Plan of Treatment: You were diagnosed with a condition called pericarditis, which is inflammation of your heart. This diagnosis was made based on your symptoms, labs, and imaging findings. This likely occurred due to a viral infection you had at home. Pericarditis can have MANY causes. It often presents with chest pain that worsens with deep breaths. An ultrasound of your heart was performed which showed fluid around your heart. You were treated with aspirin and colchicine. Please continue to take these medications at home. Take aspirin 650mg every 8 hours for 2 weeks. Take colchine 0.6mg every 12 hours for 3 months. Take protonix 40mg daily as these medications can irritate your stomach. Please follow up with your PCP within 2 weeks of discharge. Please also see a cardiologist after discharge,      SECONDARY DISCHARGE DIAGNOSES  Diagnosis: Sepsis  Assessment and Plan of Treatment: You were found to have sepsis, which is an inflammatory reaction that can occur due to an infection. This was diagnosed based on your labs and vitals. Your symptoms are now improving. Please follow up with your PCP after discharge.

## 2024-04-02 NOTE — PROGRESS NOTE ADULT - ASSESSMENT
Patient is a 78M with no significant PMH who presents with nausea, diarrhea, and decreased PO intake x 3 days. Admitted for pericarditis.

## 2024-04-02 NOTE — DISCHARGE NOTE PROVIDER - PROVIDER TOKENS
PROVIDER:[TOKEN:[[id number]],FOLLOWUP:[2 weeks],ESTABLISHEDPATIENT:[T]] PROVIDER:[TOKEN:[[id number]],SCHEDULEDAPPT:[04/05/2024],ESTABLISHEDPATIENT:[T]],PROVIDER:[TOKEN:[[id number]],FOLLOWUP:[2 weeks]]

## 2024-04-02 NOTE — DISCHARGE NOTE PROVIDER - NSDCMRMEDTOKEN_GEN_ALL_CORE_FT
aspirin 325 mg oral delayed release tablet: 2 tab(s) orally every 8 hours  colchicine 0.6 mg oral tablet: 1 tab(s) orally every 12 hours  pantoprazole 40 mg oral delayed release tablet: 1 tab(s) orally once a day (before a meal)   colchicine 0.6 mg oral tablet: 1 tab(s) orally every 12 hours  ibuprofen 800 mg oral tablet: 1 tab(s) orally every 8 hours  pantoprazole 40 mg oral delayed release tablet: 1 tab(s) orally once a day (before a meal)

## 2024-04-02 NOTE — DIETITIAN INITIAL EVALUATION ADULT - ADD RECOMMEND
1. Continue Regular diet.   >>Encourage & monitor PO intake. [place] dietary preferences as able.   2. Monitor GI tolerance, weight trends, labs, & skin integrity.  3. Defer bowel and pain regimens to team.   4. RD to remain available for diet education/intervention prn.

## 2024-04-02 NOTE — PROGRESS NOTE ADULT - PROBLEM SELECTOR PLAN 2
Patient noted to have moderate pericardial effusion with small bilateral pleural effusions, findings suspicious for pericarditis. Patient does report some pleuritic chest pain and recent chills. Otherwise feels well. VS significant initially for tachycardia and fever. Mild leukocytosis to 12.9. Cardiology consulted by ED. Trop negative. EKG sinus tach. No clinical signs of tamponade. No risk factors for autoimmune dz.  - add on RVP  - f/u TTE   - f/u inflammatory markers  - start ASA 650mg q8h + colchicine 0.6mg bid Patient noted to have moderate pericardial effusion with small bilateral pleural effusions, findings suspicious for pericarditis. Patient does report some pleuritic chest pain and recent chills. Otherwise feels well. VS significant initially for tachycardia and fever. Mild leukocytosis to 12.9. Cardiology consulted by ED. Trop negative. EKG sinus tach. No clinical signs of tamponade. No risk factors for autoimmune dz.  - add on RVP  - f/u TTE on 4/2  - f/u inflammatory markers  - start ASA 650mg q8h + colchicine 0.6mg bid

## 2024-04-02 NOTE — H&P ADULT - NSHPPHYSICALEXAM_GEN_ALL_CORE
.  VITAL SIGNS:  T(C): 37.1 (04-02-24 @ 01:00), Max: 38.5 (04-01-24 @ 16:42)  T(F): 98.8 (04-02-24 @ 01:00), Max: 101.3 (04-01-24 @ 16:42)  HR: 112 (04-02-24 @ 01:00) (105 - 120)  BP: 133/74 (04-02-24 @ 01:00) (104/65 - 138/73)  BP(mean): --  RR: 16 (04-02-24 @ 01:00) (16 - 18)  SpO2: 96% (04-02-24 @ 01:00) (95% - 98%)  Wt(kg): --    PHYSICAL EXAM:    Constitutional: resting comfortably in bed; NAD  Head: NC/AT  Eyes: PERRL, EOMI, anicteric sclera  ENT: no nasal discharge; MMM  Neck: supple; no JVD or thyromegaly  Respiratory: CTA B/L; no W/R/R, no retractions  Cardiac: +S1/S2; RRR; no M/R/G; no obvious pericardial friction rub  Gastrointestinal: abdomen soft, NT/ND; no rebound or guarding; +BSx4  Back: spine midline, no bony tenderness or step-offs; no CVAT B/L  Extremities: WWP, no clubbing or cyanosis; no peripheral edema  Musculoskeletal: NROM x4; no joint swelling, tenderness or erythema  Vascular: 2+ radial, DP/PT pulses B/L  Dermatologic: skin warm, dry and intact; no rashes, wounds, or scars  Neurologic: AAOx3; CNII-XII grossly intact; no focal deficits  Psychiatric: affect and characteristics of appearance, verbalizations, behaviors are appropriate

## 2024-04-02 NOTE — DIETITIAN INITIAL EVALUATION ADULT - PROBLEM SELECTOR PLAN 5
F: s/p 2.3L in the ED; caution w/ IVF given pericardial effusion  E: replete as needed  N: regular  DVT ppx: low risk  Activity: ambulate as tolerated  Dispo: [facility]

## 2024-04-02 NOTE — DIETITIAN INITIAL EVALUATION ADULT - OTHER INFO
78M with no significant PMH who presents with nausea, diarrhea, and decreased PO intake x 3 days. Admitted for pericarditis.     Pt seen on 7UR for assessment. Labs and medication orders reviewed. Na/K WNL, Mg 3.3 <H>, Phos 1.8 <L>. On Regular diet. Pt reports fair appetite at baseline, endorses decreased PO x3 days PTA in setting of nausea and loose BMs. Endorses appetite improving status post antiemetic regimen, reports no episodes of emesis. Reports UBW consistent with admission wt 220lb, RD observed pt with no overt signs of wasting. Pt denies nausea/vomiting/diarrhea/constipation at this time, reports last BM yesterday 4/1 normal. Pt denies difficulty chewing/swallowing. Confirms no known food allergies. No Scientologist/ethnic/cultural food preferences noted. No pressure injuries or edema documented, Yossi score 19. Reviewed formulary, pt declined oral nutrition supplements at this time. See nutrition recommendations. RD to remain available.

## 2024-04-02 NOTE — DISCHARGE NOTE PROVIDER - CARE PROVIDER_API CALL
NELI FORTUNE  [address] [facility] [address]  [place] [address]  Phone: [phone]  Fax: ([phone]  Established Patient  Follow Up Time: 2 weeks   NELI FORTUNE  [address]  [place] [address]  Phone: [phone]  Fax: ([phone]  Established Patient  Scheduled Appointment: 04/05/2024    Nely Sandoval  Cardiovascular Disease  [address] [address]  Phone: [phone]  Fax: [phone]  Follow Up Time: 2 weeks

## 2024-04-02 NOTE — H&P ADULT - NSHPSOCIALHISTORY_GEN_ALL_CORE
Former cigarette use; quit 15 years ago. Smoked > 1ppd since he was in his 20s.  Drinks 1 beer daily. Denies any illicit drug use.   Lives with his wife. Very active at baseline; goes on walks, swims in the ocean, gardens.

## 2024-04-02 NOTE — H&P ADULT - TIME BILLING
Chart Review. Independent interpretation of diagnostic tests/labs/imaging. Patient encounter. Resident teaching/review.

## 2024-04-02 NOTE — DIETITIAN INITIAL EVALUATION ADULT - OTHER CALCULATIONS
Estimated needs based on IBW as dosing wt 220lb/99.8kg >120% IBW (133%). Needs adjusted for age, BMI, and clinical status.

## 2024-04-02 NOTE — DIETITIAN INITIAL EVALUATION ADULT - PERTINENT MEDS FT
MEDICATIONS  (STANDING):  aspirin enteric coated 650 milliGRAM(s) Oral every 8 hours  colchicine 0.6 milliGRAM(s) Oral every 12 hours  pantoprazole    Tablet 40 milliGRAM(s) Oral before breakfast    MEDICATIONS  (PRN):

## 2024-04-02 NOTE — H&P ADULT - PROBLEM SELECTOR PLAN 1
Patient p/w nausea, diarrhea, decreased PO intake, chest pain. Diarrhea likely 2/2 recent initiation of antibiotics. Found to be tachycardic 120s, febrile 101.3 F, WBC 12.96; meeting 3/4 SIRS criteria. CXR showing worsening left-sided pleural effusion, CTA showing moderate-sized circumferential pericardial effusion, small bilateral pleural effusions with adjacent left lower lung compressive areas of atelectasis. Likely sepsis 2/2 viral illness and pericarditis. S/P 2.3L bolus in the ED, CTX 1g X 1.   - f/u blood cx  - monitor off antibiotics  - treat as below

## 2024-04-02 NOTE — DISCHARGE NOTE NURSING/CASE MANAGEMENT/SOCIAL WORK - PATIENT PORTAL LINK FT
You can access the FollowMyHealth Patient Portal offered by [facility] by registering at the following website: [email or web address]. By joining [email or web address]’s FollowMyHealth portal, you will also be able to view your health information using other applications (apps) compatible with our system.

## 2024-04-02 NOTE — H&P ADULT - PROBLEM SELECTOR PLAN 5
F: s/p 2.3L in the ED; caution w/ IVF given pericardial effusion  E: replete as needed  N: regular  DVT ppx: low risk  Activity: ambulate as tolerated  Dispo: [facility]

## 2024-04-02 NOTE — H&P ADULT - ASSESSMENT
Patient is a 77yo M with no signficiant PMH who presents with nausea, diarrhea, and decreased PO intake x 3 days. Admitted for pericarditis.

## 2024-04-02 NOTE — DISCHARGE NOTE PROVIDER - HOSPITAL COURSE
Patient is a 79yo M with no significant PMH who presented with nausea, diarrhea, and decreased PO intake. 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       Problem List/Main Diagnoses:   #Sepsis.   Patient p/w nausea, diarrhea, decreased PO intake, chest pain. Diarrhea likely 2/2 recent initiation of antibiotics. Found to be tachycardic 120s, febrile 101.3 F, WBC 12.96; meeting 3/4 SIRS criteria. CXR showing worsening left-sided pleural effusion, CTA showing moderate-sized circumferential pericardial effusion, small bilateral pleural effusions with adjacent left lower lung compressive areas of atelectasis. Likely sepsis 2/2 viral illness and pericarditis. S/P 2.3L bolus in the ED, CTX 1g X 1. RVP negative.  - treat pericarditis as below    #Pericarditis.   Patient noted to have moderate pericardial effusion with small bilateral pleural effusions, findings suspicious for pericarditis. Patient does report some pleuritic chest pain and recent chills. Otherwise feels well. VS significant initially for tachycardia and fever. Mild leukocytosis to 12.9. Cardiology consulted by ED. Trop negative. EKG sinus tach. No clinical signs of tamponade. No risk factors for autoimmune dz. ESR and CRP significantly elevated. TTE ___  - c/w ASA 650mg q8h x2 weeks  - c/w colchicine 0.6mg twice a day x2 months    #Pleural effusion.   Patient with b/l pleural effusions on CXR, CT showing small bilateral pleural effusions with adjacent left lower lung compressive areas of atelectasis. Currently on room air, denies shortness of breath, appears comfortable.   - incentive spirometer  - ambulate as tolerated    Patient was discharged to: Home    New medications: Aspirin 650mg, colchicine 0.6mg  Changes to old medications: None  Medications that were stopped: None    Items to follow up as outpatient: PCP, cardiology    Physical exam at the time of discharge:     Constitutional: resting comfortably in bed; NAD  Head: NC/AT  Eyes: PERRL, EOMI, anicteric sclera  ENT: no nasal discharge; MMM  Neck: supple; no JVD or thyromegaly  Respiratory: CTA B/L; no W/R/R, no retractions  Cardiac: +S1/S2; RRR; no M/R/G; no obvious pericardial friction rub  Gastrointestinal: abdomen soft, NT/ND; no rebound or guarding; +BSx4  Back: spine midline, no bony tenderness or step-offs; no CVAT B/L  Extremities: WWP, no clubbing or cyanosis; no peripheral edema  Musculoskeletal: NROM x4; no joint swelling, tenderness or erythema  Vascular: 2+ radial, DP/PT pulses B/L  Dermatologic: skin warm, dry and intact; no rashes, wounds, or scars  Neurologic: AAOx3; CNII-XII grossly intact; no focal deficits  Psychiatric: affect and characteristics of appearance, verbalizations, behaviors are appropriate    LABS & STUDIES:  SARS-CoV-2: Jorge A (02 Apr 2024 02:57)   Patient is a 79yo M with no significant PMH who presented with nausea, diarrhea, and decreased PO intake. 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       Problem List/Main Diagnoses:   #Sepsis.   Patient p/w nausea, diarrhea, decreased PO intake, chest pain. Diarrhea likely 2/2 recent initiation of antibiotics. Found to be tachycardic 120s, febrile 101.3 F, WBC 12.96; meeting 3/4 SIRS criteria. CXR showing worsening left-sided pleural effusion, CTA showing moderate-sized circumferential pericardial effusion, small bilateral pleural effusions with adjacent left lower lung compressive areas of atelectasis. Likely sepsis 2/2 viral illness and pericarditis. S/P 2.3L bolus in the ED, CTX 1g X 1. RVP negative.  - treat pericarditis as below    #Pericarditis.   Patient noted to have moderate pericardial effusion with small bilateral pleural effusions, findings suspicious for pericarditis. Patient does report some pleuritic chest pain and recent chills. Otherwise feels well. VS significant initially for tachycardia and fever. Mild leukocytosis to 12.9. Cardiology consulted by ED. Trop negative. EKG sinus tach. No clinical signs of tamponade. No risk factors for autoimmune dz. ESR and CRP significantly elevated. TTE with moderate pericardial effusion.  - c/w ASA 650mg q8h x2 weeks  - c/w colchicine 0.6mg twice a day x3 months  - c/w protonix 40mg daily before breakfast    #Pleural effusion.   Patient with b/l pleural effusions on CXR, CT showing small bilateral pleural effusions with adjacent left lower lung compressive areas of atelectasis. Currently on room air, denies shortness of breath, appears comfortable.   - incentive spirometer  - ambulate as tolerated    Patient was discharged to: Home    New medications: Aspirin 650mg, colchicine 0.6mg, protonix  Changes to old medications: None  Medications that were stopped: None    Items to follow up as outpatient: PCP, cardiology    Physical exam at the time of discharge:     Constitutional: resting comfortably in bed; NAD  Head: NC/AT  Eyes: PERRL, EOMI, anicteric sclera  ENT: no nasal discharge; MMM  Neck: supple; no JVD or thyromegaly  Respiratory: CTA B/L; no W/R/R, no retractions  Cardiac: +S1/S2; RRR; no M/R/G; no obvious pericardial friction rub  Gastrointestinal: abdomen soft, NT/ND; no rebound or guarding; +BSx4  Back: spine midline, no bony tenderness or step-offs; no CVAT B/L  Extremities: WWP, no clubbing or cyanosis; no peripheral edema  Musculoskeletal: NROM x4; no joint swelling, tenderness or erythema  Vascular: 2+ radial, DP/PT pulses B/L  Dermatologic: skin warm, dry and intact; no rashes, wounds, or scars  Neurologic: AAOx3; CNII-XII grossly intact; no focal deficits  Psychiatric: affect and characteristics of appearance, verbalizations, behaviors are appropriate    LABS & STUDIES:  SARS-CoV-2: Toma (02 Apr 2024 02:57)   Patient is a 79yo M with no significant PMH who presented with nausea, diarrhea, and decreased PO intake. 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       Problem List/Main Diagnoses:   #Sepsis 2/2 viral gastroenteritis now resolved. No signs or symptoms of pneumonia.    Patient p/w nausea, diarrhea, decreased PO intake, chest pain. Diarrhea likely 2/2 recent initiation of antibiotics. Found to be tachycardic 120s, febrile 101.3 F, WBC 12.96; meeting 3/4 SIRS criteria. CXR showing worsening left-sided pleural effusion, CTA showing moderate-sized circumferential pericardial effusion, small bilateral pleural effusions with adjacent left lower lung compressive areas of atelectasis. Likely sepsis 2/2 viral illness and pericarditis. S/P 2.3L bolus in the ED, CTX 1g X 1. RVP negative.  - treat pericarditis as below    #Pericarditis.   Patient noted to have moderate pericardial effusion with small bilateral pleural effusions, findings suspicious for pericarditis. Patient does report some pleuritic chest pain and recent chills. Otherwise feels well. VS significant initially for tachycardia and fever. Mild leukocytosis to 12.9. Cardiology consulted by ED. Trop negative. EKG sinus tach. No clinical signs of tamponade. No risk factors for autoimmune dz. ESR and CRP significantly elevated. TTE with moderate pericardial effusion.  - c/w ASA 650mg q8h x2 weeks  - c/w colchicine 0.6mg twice a day x3 months  - c/w protonix 40mg daily before breakfast    #Pleural effusion.   Patient with b/l pleural effusions on CXR, CT showing small bilateral pleural effusions with adjacent left lower lung compressive areas of atelectasis. Currently on room air, denies shortness of breath, appears comfortable.   - incentive spirometer  - ambulate as tolerated    Patient was discharged to: Home    New medications: Aspirin 650mg, colchicine 0.6mg, protonix  Changes to old medications: None  Medications that were stopped: None    Items to follow up as outpatient: PCP, cardiology    Physical exam at the time of discharge:     Constitutional: resting comfortably in bed; NAD  Head: NC/AT  Eyes: PERRL, EOMI, anicteric sclera  ENT: no nasal discharge; MMM  Neck: supple; no JVD or thyromegaly  Respiratory: CTA B/L; no W/R/R, no retractions  Cardiac: +S1/S2; RRR; no M/R/G; no obvious pericardial friction rub  Gastrointestinal: abdomen soft, NT/ND; no rebound or guarding; +BSx4  Back: spine midline, no bony tenderness or step-offs; no CVAT B/L  Extremities: WWP, no clubbing or cyanosis; no peripheral edema  Musculoskeletal: NROM x4; no joint swelling, tenderness or erythema  Vascular: 2+ radial, DP/PT pulses B/L  Dermatologic: skin warm, dry and intact; no rashes, wounds, or scars  Neurologic: AAOx3; CNII-XII grossly intact; no focal deficits  Psychiatric: affect and characteristics of appearance, verbalizations, behaviors are appropriate    LABS & STUDIES:  SARS-CoV-2: Toma (02 Apr 2024 02:57)

## 2024-04-02 NOTE — DISCHARGE NOTE NURSING/CASE MANAGEMENT/SOCIAL WORK - NSDCPEFALRISK_GEN_ALL_CORE
For information on Fall & Injury Prevention, visit: [email or web address] OR  [email or web address] OR  https://www.cdc.gov/steadi/patient.html

## 2024-04-02 NOTE — DIETITIAN INITIAL EVALUATION ADULT - EDUCATION DIETARY MODIFICATIONS
Educated on strategies to promote PO intake in setting of nausea, discussed foods likely to be well tolerated. Answered all pt questions and pt aware RD remains available for additional questions/concerns./(2) meets goals/outcomes/verbalization

## 2024-04-02 NOTE — CHART NOTE - NSCHARTNOTEFT_GEN_A_CORE
Spoke with patient about GOC, pt reports having discussed advanced directives in the past, would not like to be intubated however would like to be resuscitated.

## 2024-04-02 NOTE — PROGRESS NOTE ADULT - PROBLEM SELECTOR PLAN 5
F: s/p 2.3L in the ED; caution w/ IVF given pericardial effusion  E: replete as needed  N: regular  DVT ppx: low risk  Activity: ambulate as tolerated  Dispo: [facility] F: s/p 2.3L in the ED; caution w/ IVF given pericardial effusion  E: replete as needed  N: regular  DVT ppx: low risk  Activity: ambulate as tolerated  Dispo: Home

## 2024-04-02 NOTE — DISCHARGE NOTE PROVIDER - ATTENDING DISCHARGE PHYSICAL EXAMINATION:
GENERAL: NAD, lying in bed comfortably  HEAD:  Atraumatic, Normocephalic  EYES: EOMI, PERRLA, conjunctiva and sclera clear  CHEST/LUNG: Clear to auscultation bilaterally; No rales, rhonchi, wheezing, or rubs. Unlabored respirations  HEART: Regular rate and rhythm; No murmurs, rubs, or gallops  ABDOMEN: BSx4; Soft, nontender, nondistended  EXTREMITIES:  2+ Peripheral Pulses, brisk capillary refill. No clubbing, cyanosis, or edema  NERVOUS SYSTEM:  A&Ox3, no focal deficits

## 2024-04-02 NOTE — PROGRESS NOTE ADULT - SUBJECTIVE AND OBJECTIVE BOX
O/N Events:    Subjective/ROS: Patient seen and examined at bedside.     Denies Fever/Chills, HA, CP, SOB, n/v, changes in bowel/urinary habits.  12pt ROS otherwise negative.    VITALS  Vital Signs Last 24 Hrs  T(C): 37.1 (2024 05:31), Max: 38.5 (2024 16:42)  T(F): 98.7 (2024 05:31), Max: 101.3 (2024 16:42)  HR: 103 (2024 05:31) (103 - 120)  BP: 113/72 (2024 05:31) (104/65 - 138/73)  BP(mean): --  RR: 18 (:31) (16 - 18)  SpO2: 93% (:31) (93% - 98%)    Parameters below as of 2024 05:31  Patient On (Oxygen Delivery Method): room air        CAPILLARY BLOOD GLUCOSE          PHYSICAL EXAM  General: NAD  Head: NC/AT; MMM; PERRL; EOMI;  Neck: Supple; no JVD  Respiratory: CTAB; no wheezes/rales/rhonchi  Cardiovascular: Regular rhythm/rate; S1/S2+, no murmurs, rubs gallops   Gastrointestinal: Soft; NTND; bowel sounds normal and present  Extremities: WWP; no edema/cyanosis  Neurological: A&Ox3, CNII-XII grossly intact; no obvious focal deficits    MEDICATIONS  (STANDING):  aspirin enteric coated 650 milliGRAM(s) Oral every 8 hours  colchicine 0.6 milliGRAM(s) Oral every 12 hours    MEDICATIONS  (PRN):      No Known Allergies      LABS                        14.2   12.96 )-----------( 405      ( 2024 16:30 )             40.0     04-01    138  |  102  |  14  ----------------------------<  175<H>  3.6   |  24  |  1.16    Ca    8.9      2024 16:30    TPro  7.6  /  Alb  2.5<L>  /  TBili  0.7  /  DBili  x   /  AST  14<L>  /  ALT  31  /  AlkPhos  72  04-01    PT/INR - ( 2024 16:30 )   PT: 14.8 sec;   INR: 1.36          PTT - ( 2024 16:30 )  PTT:37.5 sec  Urinalysis Basic - ( 2024 16:30 )    Color: Dark Yellow / Appearance: Clear / S.085 / pH: x  Gluc: 175 mg/dL / Ketone: Trace mg/dL  / Bili: Negative / Urobili: 1.0 mg/dL   Blood: x / Protein: 30 mg/dL / Nitrite: Negative   Leuk Esterase: Negative / RBC: 1 /HPF / WBC 3 /HPF   Sq Epi: x / Non Sq Epi: x / Bacteria: Few /HPF              IMAGING/EKG/ETC   O/N Events: [place]    Subjective/ROS: Patient seen and examined at bedside.  The patient endorsed having chills and feeling febrile on and off overnight.   The patient denies HA, chest pain and SOB.  Patient did endorse that only when laying in left lateral decubitus position he felt a sharp pain on deep inspiration in his left hypochondriac region.   Patient denies nausea and vomiting, no changes in bowel/ urinary habits.    12pt ROS otherwise negative.     VITALS  Vital Signs Last 24 Hrs  T(C): 37.1 (2024 05:31), Max: 38.5 (2024 16:42)  T(F): 98.7 (2024 05:31), Max: 101.3 (2024 16:42)  HR: 103 (:31) (103 - 120)  BP: 113/72 (:31) (104/65 - 138/73)  BP(mean): --  RR: 18 (2024 05:31) (16 - 18)  SpO2: 93% (2024 05:31) (93% - 98%)    Parameters below as of 2024 05:31  Patient On (Oxygen Delivery Method): room air        CAPILLARY BLOOD GLUCOSE          PHYSICAL EXAM  General: NAD  Head: moist mucus membranes; PERRL; EOMI;  Neck: Supple; no JVD  Respiratory: CTAB ; no wheezes/rales/rhonchi  Cardiovascular: Regular rhythm/rate; S1/S2+, no murmurs, rubs gallops   Gastrointestinal: Soft; NTND; bowel sounds normal and present  Extremities: WWP; no edema/cyanosis  Neurological: A&Ox3, no obvious focal deficits    MEDICATIONS  (STANDING):  aspirin enteric coated 650 milliGRAM(s) Oral every 8 hours  colchicine 0.6 milliGRAM(s) Oral every 12 hours    MEDICATIONS  (PRN):      No Known Allergies      LABS                        14.2   12.96 )-----------( 405      ( 2024 16:30 )             40.0     04-    138  |  102  |  14  ----------------------------<  175<H>  3.6   |  24  |  1.16    Ca    8.9      2024 16:30    TPro  7.6  /  Alb  2.5<L>  /  TBili  0.7  /  DBili  x   /  AST  14<L>  /  ALT  31  /  AlkPhos  72  04-    PT/INR - ( 2024 16:30 )   PT: 14.8 sec;   INR: 1.36          PTT - ( 2024 16:30 )  PTT:37.5 sec  Urinalysis Basic - ( 2024 16:30 )    Color: Dark Yellow / Appearance: Clear / S.085 / pH: x  Gluc: 175 mg/dL / Ketone: Trace mg/dL  / Bili: Negative / Urobili: 1.0 mg/dL   Blood: x / Protein: 30 mg/dL / Nitrite: Negative   Leuk Esterase: Negative / RBC: 1 /HPF / WBC 3 /HPF   Sq Epi: x / Non Sq Epi: x / Bacteria: Few /HPF              IMAGING/EKG/ETC    < from: CT Angio Chest PE Protocol w/ IV Cont (24 @ 18:41) >  IMPRESSION: Limited PE study for evaluation of some of the subsegmental   and a few of the segmental pulmonary arterial branches due to respiratory   motion artifact. No pulmonary arterial emboli within the other well   visualized pulmonary arteries.    Moderate-sized circumferential pericardial effusion. Small bilateral   pleural effusions with adjacent left lower lung compressive areas of   atelectasis. Constellation of findings can be seen in the setting of   constrictive pericarditis.    < end of copied text >  -------------------------------  < from: Xray Chest 1 View AP/PA (24 @ 17:12) >  HISTORY: Pneumonia weakness    IMPRESSION:    Small left lung base pleural effusion and/or focal atelectasis.   Hypoinflation. No pneumothorax. Unremarkable cardiac silhouette. No acute   bone abnormality.    < end of copied text >

## 2024-04-03 ENCOUNTER — TRANSCRIPTION ENCOUNTER (OUTPATIENT)
Age: 79
End: 2024-04-03

## 2024-04-03 PROBLEM — Z00.00 ENCOUNTER FOR PREVENTIVE HEALTH EXAMINATION: Status: ACTIVE | Noted: 2024-04-03

## 2024-04-03 LAB
CULTURE RESULTS: SIGNIFICANT CHANGE UP
CULTURE RESULTS: SIGNIFICANT CHANGE UP
SPECIMEN SOURCE: SIGNIFICANT CHANGE UP
SPECIMEN SOURCE: SIGNIFICANT CHANGE UP

## 2024-04-11 DIAGNOSIS — K52.9 NONINFECTIVE GASTROENTERITIS AND COLITIS, UNSPECIFIED: ICD-10-CM

## 2024-04-11 DIAGNOSIS — B33.23 VIRAL PERICARDITIS: ICD-10-CM

## 2024-04-11 DIAGNOSIS — I31.39 OTHER PERICARDIAL EFFUSION (NONINFLAMMATORY): ICD-10-CM

## 2024-04-11 DIAGNOSIS — J90 PLEURAL EFFUSION, NOT ELSEWHERE CLASSIFIED: ICD-10-CM

## 2024-04-11 DIAGNOSIS — J98.11 ATELECTASIS: ICD-10-CM

## 2024-04-11 DIAGNOSIS — D75.839 THROMBOCYTOSIS, UNSPECIFIED: ICD-10-CM

## 2024-04-11 DIAGNOSIS — I31.9 DISEASE OF PERICARDIUM, UNSPECIFIED: ICD-10-CM

## 2024-04-11 DIAGNOSIS — A41.9 SEPSIS, UNSPECIFIED ORGANISM: ICD-10-CM

## 2024-04-11 DIAGNOSIS — J30.2 OTHER SEASONAL ALLERGIC RHINITIS: ICD-10-CM

## 2024-04-11 DIAGNOSIS — Z87.891 PERSONAL HISTORY OF NICOTINE DEPENDENCE: ICD-10-CM

## 2024-04-17 ENCOUNTER — TRANSCRIPTION ENCOUNTER (OUTPATIENT)
Age: 79
End: 2024-04-17

## 2024-04-23 ENCOUNTER — APPOINTMENT (OUTPATIENT)
Dept: HEART AND VASCULAR | Facility: CLINIC | Age: 79
End: 2024-04-23

## 2024-04-24 ENCOUNTER — TRANSCRIPTION ENCOUNTER (OUTPATIENT)
Age: 79
End: 2024-04-24

## 2024-04-27 ENCOUNTER — TRANSCRIPTION ENCOUNTER (OUTPATIENT)
Age: 79
End: 2024-04-27

## 2025-03-17 NOTE — PATIENT PROFILE ADULT - NSPROPOAPRESSUREINJURY_GEN_A_NUR
Endocrine refill protocol for lipid lowering medications    Protocol Criteria:  FAILED Reason: No labs completed in required time frame    If all below requirements are met, send a 90-day supply with 1 refill per provider protocol.    Verify appointment with Endocrinology completed in the last 6 months or scheduled in the next 3 months.  Lipid panel must have been completed in the last 12 months   ALT result below 80  LDL result below 130    Last completed office visit:1/16/2025 Niurka Romano DO   Next scheduled Follow up:   Future Appointments   Date Time Provider Department Center   4/28/2025 11:00 AM Niurka Romano DO [id number] [place]   4/28/2025  1:00 PM Luis Miguel Gomez MD [facility] [address]      Last Lipid panel date: Cholesterol: 113, done on 3/26/2024.  HDL Cholesterol: 47, done on 3/26/2024.  TriGlycerides 98, done on 3/26/2024.  LDL Cholesterol: 47, done on 3/26/2024.     Last ALT result: Last ALT was 28 done on 10/4/2023.  Last AST was 14 done on 10/4/2023.    Routed for review.         no

## 2025-06-26 NOTE — ED PROVIDER NOTE - CADM POA CENTRAL LINE
Faxed script, office notes and medical necessity letter to Mona at fax number [phone].       Emailed script, office notes and medical necessity letter to [email or web address]   No